# Patient Record
(demographics unavailable — no encounter records)

---

## 2022-05-09 NOTE — XRAY REPORT
BILATERAL ANKLES 6 VIEWS

BILATERAL FEET 6 VIEWS



INDICATION:

Bilateral foot and ankle pain. No recent injuries..



COMPARISON:

No relevant prior imaging study available.



FINDINGS:

Right foot: No acute abnormality. Mild osteoarthrosis changes are noted at the IP joints and first MT
P joint. 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 There is mild calcaneal enthesopa
thy at the plantar fascia attachment.



Left foot: No acute abnormality. Mild osteoarthrosis changes are noted. There is an accessory navicul
ar (type I or type II). There is calcaneal enthesopathy at the plantar fascia attachment.



Right ankle: Corticated ossific density adjacent to the tip of the medial malleolus may be chronic un
united fracture fragment or heterotopic ossification. No acute fracture or dislocation. No significan
t degenerative change.



Left ankle: No acute skeletal abnormality. No significant degenerative changes.



IMPRESSION:

1. No acute findings.







Signer Name: Benja Martinez MD 

Signed: 5/9/2022 4:05 PM

Workstation Name: [email or web address]

## 2022-05-09 NOTE — XRAY REPORT
BILATERAL ANKLES 6 VIEWS

BILATERAL FEET 6 VIEWS



INDICATION:

Bilateral foot and ankle pain. No recent injuries..



COMPARISON:

No relevant prior imaging study available.



FINDINGS:

Right foot: No acute abnormality. Mild osteoarthrosis changes are noted at the IP joints and first MT
P joint. 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 There is mild calcaneal enthesopa
thy at the plantar fascia attachment.



Left foot: No acute abnormality. Mild osteoarthrosis changes are noted. There is an accessory navicul
ar (type I or type II). There is calcaneal enthesopathy at the plantar fascia attachment.



Right ankle: Corticated ossific density adjacent to the tip of the medial malleolus may be chronic un
united fracture fragment or heterotopic ossification. No acute fracture or dislocation. No significan
t degenerative change.



Left ankle: No acute skeletal abnormality. No significant degenerative changes.



IMPRESSION:

1. No acute findings.







Signer Name: Benja Martinez MD 

Signed: 5/9/2022 4:05 PM

Workstation Name: [email or web address]

## 2022-06-24 NOTE — MAMMOGRAPHY REPORT
BILATERAL DIGITAL DIAGNOSTIC MAMMOGRAM WITH CAD CONVENTIONAL, 6/23/2022

LEFT LIMITED BREAST ULTRASOUND

 

CLINICAL INFORMATION / INDICATION: Patient presents for evaluation of an area of palpable concern in 
the left breast. N63.4 LUMP



TECHNIQUE: Digital bilateral mammographic imaging was performed. Spot compression views were obtained
. Limited ultrasound was performed. This examination was interpreted with the benefit of Computer-Aid
ed Detection (CAD) analysis. 



COMPARISON: None available



FINDINGS: 



Breast Density: The breasts are heterogeneously dense, which may obscure small masses.



MAMMOGRAPHIC FINDINGS: No dominant mass, suspicious calcifications, or architectural distortion in th
e right breast. Corresponding with the site of palpable concern in the anterior subareolar left breas
t, there is a masslike focal asymmetric density, measuring up to approximately 2 cm. There is associa
nj left nipple retraction. Targeted ultrasound was performed for further evaluation.



ULTRASOUND FINDINGS: Targeted ultrasound evaluation was performed of the area of interest.   Targeted
 ultrasound of the area of palpable concern in the 6:00 subareolar left breast reveals an irregular h
ypoechoic mass, measuring up to 2.4 x 1.7 x 2.0 cm. Internal vascularity is demonstrated. Targeted ul
trasound of the left axilla reveals a few morphologically normal lymph nodes with thin cortex and pre
served shahzad. No suspicious lymphadenopathy identified.





IMPRESSION: 

1. An irregular hypoechoic mass accounts for the area of palpable concern in the subareolar left han
st and is highly suggestive of malignancy, ultrasound-guided biopsy is recommended.



Follow up recommendation: Biopsy



BI-RADS Category 5: HIGHLY SUGGESTIVE OF MALIGNANCY.



-------------------------------------------------------------------------------------------

A "normal" or negative report should not discourage follow up or biopsy of a clinically significant f
inding.



A written summary of these findings will be mailed to the patient. The patient will be entered into a
 mammography reporting system which will generate a reminder letter for the patient's next appointmen
t at the appropriate interval.



According to the [employer], yearly mammograms are recommended starting at age 40 
and continuing as long as a woman is in good health.  Breast MRI is recommended for women with an pauline
roximately 20-25% or greater lifetime risk of breast cancer, including women with a strong family his
tory of breast or ovarian cancer and women who have been treated for Hodgkin's disease.



Signer Name: Carole Brooke MD 

Signed: 6/24/2022 1:23 PM

Workstation Name: [employer]

## 2022-06-24 NOTE — ULTRASOUND REPORT
BILATERAL DIGITAL DIAGNOSTIC MAMMOGRAM WITH CAD CONVENTIONAL, 6/23/2022

LEFT LIMITED BREAST ULTRASOUND

 

CLINICAL INFORMATION / INDICATION: Patient presents for evaluation of an area of palpable concern in 
the left breast. N63.4 LUMP



TECHNIQUE: Digital bilateral mammographic imaging was performed. Spot compression views were obtained
. Limited ultrasound was performed. This examination was interpreted with the benefit of Computer-Aid
ed Detection (CAD) analysis. 



COMPARISON: None available



FINDINGS: 



Breast Density: The breasts are heterogeneously dense, which may obscure small masses.



MAMMOGRAPHIC FINDINGS: No dominant mass, suspicious calcifications, or architectural distortion in th
e right breast. Corresponding with the site of palpable concern in the anterior subareolar left breas
t, there is a masslike focal asymmetric density, measuring up to approximately 2 cm. There is associa
nj left nipple retraction. Targeted ultrasound was performed for further evaluation.



ULTRASOUND FINDINGS: Targeted ultrasound evaluation was performed of the area of interest.   Targeted
 ultrasound of the area of palpable concern in the 6:00 subareolar left breast reveals an irregular h
ypoechoic mass, measuring up to 2.4 x 1.7 x 2.0 cm. Internal vascularity is demonstrated. Targeted ul
trasound of the left axilla reveals a few morphologically normal lymph nodes with thin cortex and pre
served shahzad. No suspicious lymphadenopathy identified.





IMPRESSION: 

1. An irregular hypoechoic mass accounts for the area of palpable concern in the subareolar left han
st and is highly suggestive of malignancy, ultrasound-guided biopsy is recommended.



Follow up recommendation: Biopsy



BI-RADS Category 5: HIGHLY SUGGESTIVE OF MALIGNANCY.



-------------------------------------------------------------------------------------------

A "normal" or negative report should not discourage follow up or biopsy of a clinically significant f
inding.



A written summary of these findings will be mailed to the patient. The patient will be entered into a
 mammography reporting system which will generate a reminder letter for the patient's next appointmen
t at the appropriate interval.



According to the [employer], yearly mammograms are recommended starting at age 40 
and continuing as long as a woman is in good health.  Breast MRI is recommended for women with an pauline
roximately 20-25% or greater lifetime risk of breast cancer, including women with a strong family his
tory of breast or ovarian cancer and women who have been treated for Hodgkin's disease.



Signer Name: Carole Brooke MD 

Signed: 6/24/2022 1:23 PM

Workstation Name: [email or web address]

## 2022-06-30 NOTE — ULTRASOUND REPORT
ULTRASOUND GUIDED LEFT BREAST BIOPSY, 6/30/2022



CLINICAL INFORMATION / INDICATION: C50.012. Left breast mass



COMPARISON: 6/23/2022.



PROCEDURE:

Risks, benefits, and indications to the procedure were discussed with the patient in detail, includin
g bleeding, infection, hematoma formation, and inadequate tissue sampling. The patient agreed to proc
eed with both verbal and written consent. A timeout procedure was performed with two patient identifi
ers.



The breast was prepped and draped in the usual sterile fashion. Lidocaine 1% with and without epineph
rine were used for local anesthesia. Under direct ultrasound guidance, multiple 12-gauge core samples
 were obtained of the 2.4 x 2.1 x 1.6 cm hypoechoic retroareolar mass.  A biopsy marker was then plac
ed. Biopsy device was removed and hemostasis achieved with manual pressure. A sterile dressing was ap
plied to the skin.



The patient tolerated the procedure without difficulty. No complications were encountered. 



Postbiopsy instructions were discussed with the patient and given in writing.  Specimens were sent to
 pathology.



IMPRESSION:

1. Technically successful ultrasound guided left breast biopsy.





Signer Name: Carlos Rios Jr, MD 

Signed: 6/30/2022 1:16 PM

Workstation Name: [id number]

## 2022-07-01 NOTE — MAMMOGRAPHY REPORT
DIGITAL DIAGNOSTIC MAMMOGRAM CONVENTIONAL, 6/30/2022



CLINICAL INFORMATION / INDICATION:  Postbiopsy mammogram following left breast ultrasound-guided biop
sy.



TECHNIQUE:  Digital left mammographic imaging was performed.



COMPARISON: Prior mammogram 6/23/2022



FINDINGS: 



Breast Density: The breasts are heterogeneously dense, which may obscure small masses.



Postbiopsy mammogram demonstrates a biopsy clip appropriately positioned at site of previously descri
bed mass in the anterior subareolar to 6:00 left breast.



IMPRESSION: 

1. Appropriately positioned biopsy clip following left breast ultrasound-guided biopsy.



Follow up recommendation: No recall.



Post biopsy imaging.



-------------------------------------------------------------------------------------------

A "normal" or negative report should not discourage follow up or biopsy of a clinically significant f
inding.



A written summary of these findings will be mailed to the patient. The patient will be entered into a
 mammography reporting system which will generate a reminder letter for the patient's next appointmen
t at the appropriate interval.



According to the [employer], yearly mammograms are recommended starting at age 40 
and continuing as long as a woman is in good health.  Breast MRI is recommended for women with an pauline
roximately 20-25% or greater lifetime risk of breast cancer, including women with a strong family his
tory of breast or ovarian cancer and women who have been treated for Hodgkin's disease.



Signer Name: Carole Brooke MD 

Signed: 7/1/2022 8:53 AM

Workstation Name: [email or web address]